# Patient Record
Sex: FEMALE | Race: OTHER | NOT HISPANIC OR LATINO
[De-identification: names, ages, dates, MRNs, and addresses within clinical notes are randomized per-mention and may not be internally consistent; named-entity substitution may affect disease eponyms.]

---
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
## 2024-02-22 ENCOUNTER — EMERGENCY (EMERGENCY)
Facility: HOSPITAL | Age: 19
LOS: 0 days | Discharge: HOME | End: 2024-02-22
Attending: EMERGENCY MEDICINE
Payer: COMMERCIAL

## 2024-02-22 VITALS
WEIGHT: 130.07 LBS | TEMPERATURE: 99 F | SYSTOLIC BLOOD PRESSURE: 121 MMHG | RESPIRATION RATE: 18 BRPM | DIASTOLIC BLOOD PRESSURE: 76 MMHG | HEART RATE: 130 BPM | OXYGEN SATURATION: 100 %
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
## 2024-02-22 DIAGNOSIS — R09.81 NASAL CONGESTION: ICD-10-CM
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
## 2024-02-22 DIAGNOSIS — R05.9 COUGH, UNSPECIFIED: ICD-10-CM
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
## 2024-02-22 DIAGNOSIS — S09.92XA UNSPECIFIED INJURY OF NOSE, INITIAL ENCOUNTER: ICD-10-CM

## 2024-02-22 PROCEDURE — 99284 EMERGENCY DEPT VISIT MOD MDM: CPT

## 2024-02-22 PROCEDURE — 70160 X-RAY EXAM OF NASAL BONES: CPT

## 2024-02-22 PROCEDURE — 99283 EMERGENCY DEPT VISIT LOW MDM: CPT | Mod: 25

## 2024-02-22 PROCEDURE — 70160 X-RAY EXAM OF NASAL BONES: CPT | Mod: 26

## 2024-02-22 RX ORDER — ACETAMINOPHEN 500 MG
650 TABLET ORAL ONCE
Refills: 0 | Status: COMPLETED | OUTPATIENT
Start: 2024-02-22 | End: 2024-02-22

## 2024-02-22 RX ADMIN — Medication 650 MILLIGRAM(S): at 18:03
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
## 2024-02-22 NOTE — ED PROVIDER NOTE - PROGRESS NOTE DETAILS
ANCA-- discussed results and plan for dc with follow up, return precautions pt and family understand and agree

## 2024-02-22 NOTE — ED PROVIDER NOTE - PHYSICAL EXAMINATION
CONSTITUTIONAL: NAD  SKIN: Warm, dry  HEAD: NCAT  EYES: Clear conjunctiva   ENT: MMM, no septal hematoma. mild tenderness over the nasal bridge. no raccoon eyes or capone sign  NECK: No midline tenderness, no stepoff, supple  CARD: RRR, S1, S2; no M/R/G  RESP: Normal respiratory effort, CTAB  CHEST: no crepitus or chest wall tenderness, equal chest rise  ABD: Soft, nontender. No rebound, rigidity, or guarding  BACK: no midline tenderness or stepoffs  EXT:  no gross deformity, palpable distal pulses  NEURO: Grossly intact. Awake, alert, moving all extremities, no facial asymmetry.

## 2024-02-22 NOTE — ED PROVIDER NOTE - PATIENT PORTAL LINK FT
You can access the FollowMyHealth Patient Portal offered by [facility] by registering at the following website: [email or web address]. By joining [employer]’s FollowMyHealth portal, you will also be able to view your health information using other applications (apps) compatible with our system.

## 2024-02-24 ENCOUNTER — APPOINTMENT (OUTPATIENT)
Dept: ORTHOPEDIC SURGERY | Facility: CLINIC | Age: 19
End: 2024-02-24
Payer: COMMERCIAL

## 2024-02-24 VITALS — WEIGHT: 293 LBS | HEIGHT: 61 IN | BODY MASS INDEX: 55.32 KG/M2

## 2024-02-24 DIAGNOSIS — M25.512 PAIN IN LEFT SHOULDER: ICD-10-CM

## 2024-02-24 DIAGNOSIS — M54.2 CERVICALGIA: ICD-10-CM

## 2024-02-24 DIAGNOSIS — S89.92XA UNSPECIFIED INJURY OF LEFT LOWER LEG, INITIAL ENCOUNTER: ICD-10-CM

## 2024-02-24 PROCEDURE — 73030 X-RAY EXAM OF SHOULDER: CPT | Mod: LT

## 2024-02-24 PROCEDURE — 99203 OFFICE O/P NEW LOW 30 MIN: CPT | Mod: ACP

## 2024-02-24 PROCEDURE — 72040 X-RAY EXAM NECK SPINE 2-3 VW: CPT

## 2024-02-24 PROCEDURE — 73562 X-RAY EXAM OF KNEE 3: CPT | Mod: LT
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
## 2024-02-24 NOTE — DATA REVIEWED
[Cervical Spine] : cervical spine [Left] : left [Knee] : knee [Shoulder] : shoulder [FreeTextEntry1] : 2 views of the cervical spine were obtained here in the office today and show: Complete loss of normal lordotic curvature.  Well-maintained space between vertebrae.  No fractures noted. [FreeTextEntry2] : 3 views of the left shoulder were obtained here in the office today and show: No fracture or dislocation.  No glenohumeral joint arthritis.  No soft tissue calcifications or bone masses. [FreeTextEntry3] : 3 views of the left knee were obtained here in the office today and show: Well-preserved medial and lateral compartments.  No soft tissue calcifications, no bone masses.  No fractures.
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
## 2024-02-24 NOTE — IMAGING
[de-identified] : Physical exam of the cervical spine: No edema, no erythema, no ecchymosis.  No tenderness to palpation over the cervical vertebrae.  She has tenderness to palpation over the right-sided left-sided cervical paraspinal muscles.  Good range of motion with flexion, extension, lateral rotation.  5 out of 5 upper extremity strength, 5-5  strength.  Physical exam of the left upper arm, there is an area of ecchymosis over the proximal humerus.  No erythema.  Good range of motion of the shoulder with pain.  Some pain with rotator cuff resistance testing, positive Neligh's testing.  Intact to light touch.  Physical exam of the left knee: No effusion, no erythema.  Small area of ecchymosis noted over the medial aspect of the tibial plateau.  Good range of motion of the knee.  No tenderness to palpation over the joint lines over the collateral ligaments.  Tenderness to palpation of the anterior aspect of the knee.  Negative Armida's testing.  Equivocal posterior drawer testing.  Intact to light touch, nonantalgic gait.

## 2024-03-07 ENCOUNTER — APPOINTMENT (OUTPATIENT)
Dept: MRI IMAGING | Facility: CLINIC | Age: 19
End: 2024-03-07
Payer: COMMERCIAL

## 2024-03-07 PROCEDURE — 73721 MRI JNT OF LWR EXTRE W/O DYE: CPT | Mod: LT

## 2024-03-19 ENCOUNTER — APPOINTMENT (OUTPATIENT)
Dept: ORTHOPEDIC SURGERY | Facility: CLINIC | Age: 19
End: 2024-03-19
Payer: COMMERCIAL

## 2024-03-19 PROCEDURE — 99213 OFFICE O/P EST LOW 20 MIN: CPT | Mod: ACP
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
## 2024-03-19 NOTE — IMAGING
[de-identified] : Physical exam of the left knee: No effusion, no erythema, no ecchymosis.  Full extension, flexion to 125 degrees with pain.  Tenderness to palpation over the MCL and the medial joint line.  No lateral joint line tenderness to palpation.  Mild tenderness to palpation over the medial patellar facet.  No tenderness to palpation over the lateral patellar facet.  Stable to anterior and posterior drawer testing.  Stable to varus and valgus stress testing.  Intact to light touch.  Nonantalgic gait.

## 2024-03-19 NOTE — DISCUSSION/SUMMARY
[de-identified] : Today I recommended a cortisone injection, she would like to hold off on that.  She will start formal physical therapy, Rx provided for that.  I will see her back in 6 weeks for further evaluation.

## 2024-04-30 ENCOUNTER — APPOINTMENT (OUTPATIENT)
Dept: ORTHOPEDIC SURGERY | Facility: CLINIC | Age: 19
End: 2024-04-30
Payer: COMMERCIAL

## 2024-04-30 DIAGNOSIS — S83.519D SPRAIN OF ANTERIOR CRUCIATE LIGAMENT OF UNSPECIFIED KNEE, SUBSEQUENT ENCOUNTER: ICD-10-CM

## 2024-04-30 DIAGNOSIS — S83.412D SPRAIN OF MEDIAL COLLATERAL LIGAMENT OF LEFT KNEE, SUBSEQUENT ENCOUNTER: ICD-10-CM

## 2024-04-30 DIAGNOSIS — S83.529D: ICD-10-CM

## 2024-04-30 PROCEDURE — 99213 OFFICE O/P EST LOW 20 MIN: CPT | Mod: ACP

## 2024-04-30 NOTE — DISCUSSION/SUMMARY
[de-identified] : At this point I recommend the patient finishes up the remaining formal physical therapy sessions and converts to a home program.  We will see him back on as-needed basis for her left knee.

## 2024-04-30 NOTE — IMAGING
[de-identified] : Physical exam of the left knee: No effusion, no erythema, no ecchymosis.  Good range of motion with flexion and extension of the left knee.  Mild tenderness to palpation over the MCL.  No tenderness to palpation over the joint lines.  Negative anterior and posterior drawer testing.  Stable to varus and valgus stress testing.  Negative Lachman testing.  Intact to light touch.  Nonantalgic gait.
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
## 2025-03-11 ENCOUNTER — APPOINTMENT (OUTPATIENT)
Facility: CLINIC | Age: 20
End: 2025-03-11
Payer: COMMERCIAL

## 2025-03-11 DIAGNOSIS — S83.529D: ICD-10-CM

## 2025-03-11 DIAGNOSIS — S83.412D SPRAIN OF MEDIAL COLLATERAL LIGAMENT OF LEFT KNEE, SUBSEQUENT ENCOUNTER: ICD-10-CM

## 2025-03-11 DIAGNOSIS — S83.519D SPRAIN OF ANTERIOR CRUCIATE LIGAMENT OF UNSPECIFIED KNEE, SUBSEQUENT ENCOUNTER: ICD-10-CM

## 2025-03-11 PROCEDURE — 99213 OFFICE O/P EST LOW 20 MIN: CPT

## 2025-03-11 PROCEDURE — 73562 X-RAY EXAM OF KNEE 3: CPT | Mod: LT

## 2025-03-11 RX ORDER — MELOXICAM 15 MG/1
15 TABLET ORAL
Qty: 30 | Refills: 0 | Status: ACTIVE | COMMUNITY
Start: 2025-03-11 | End: 1900-01-01

## 2025-04-22 ENCOUNTER — TRANSCRIPTION ENCOUNTER (OUTPATIENT)
Age: 20
End: 2025-04-22

## 2025-04-22 ENCOUNTER — APPOINTMENT (OUTPATIENT)
Facility: CLINIC | Age: 20
End: 2025-04-22
Payer: COMMERCIAL

## 2025-04-22 DIAGNOSIS — S83.519D SPRAIN OF ANTERIOR CRUCIATE LIGAMENT OF UNSPECIFIED KNEE, SUBSEQUENT ENCOUNTER: ICD-10-CM

## 2025-04-22 DIAGNOSIS — S83.412D SPRAIN OF MEDIAL COLLATERAL LIGAMENT OF LEFT KNEE, SUBSEQUENT ENCOUNTER: ICD-10-CM

## 2025-04-22 DIAGNOSIS — S83.529D: ICD-10-CM

## 2025-04-22 DIAGNOSIS — S83.232D COMPLEX TEAR OF MEDIAL MENISCUS, CURRENT INJURY, LEFT KNEE, SUBSEQUENT ENCOUNTER: ICD-10-CM

## 2025-04-22 PROCEDURE — 99213 OFFICE O/P EST LOW 20 MIN: CPT

## 2025-06-03 ENCOUNTER — APPOINTMENT (OUTPATIENT)
Facility: CLINIC | Age: 20
End: 2025-06-03
Payer: COMMERCIAL

## 2025-06-03 DIAGNOSIS — S83.529D: ICD-10-CM

## 2025-06-03 DIAGNOSIS — S83.412D SPRAIN OF MEDIAL COLLATERAL LIGAMENT OF LEFT KNEE, SUBSEQUENT ENCOUNTER: ICD-10-CM

## 2025-06-03 DIAGNOSIS — S83.519D SPRAIN OF ANTERIOR CRUCIATE LIGAMENT OF UNSPECIFIED KNEE, SUBSEQUENT ENCOUNTER: ICD-10-CM

## 2025-06-03 PROCEDURE — 99213 OFFICE O/P EST LOW 20 MIN: CPT
